# Patient Record
Sex: MALE | Race: WHITE | NOT HISPANIC OR LATINO | URBAN - METROPOLITAN AREA
[De-identification: names, ages, dates, MRNs, and addresses within clinical notes are randomized per-mention and may not be internally consistent; named-entity substitution may affect disease eponyms.]

---

## 2022-03-21 ENCOUNTER — NEW PATIENT (OUTPATIENT)
Dept: URBAN - METROPOLITAN AREA CLINIC 4 | Facility: CLINIC | Age: 32
End: 2022-03-21

## 2022-03-21 DIAGNOSIS — H11.153: ICD-10-CM

## 2022-03-21 PROCEDURE — 92015 DETERMINE REFRACTIVE STATE: CPT

## 2022-03-21 PROCEDURE — 92004 COMPRE OPH EXAM NEW PT 1/>: CPT

## 2022-03-21 ASSESSMENT — KERATOMETRY
OS_AXISANGLE_DEGREES: 007
OD_AXISANGLE_DEGREES: 162
OD_AXISANGLE2_DEGREES: 72
OS_K2POWER_DIOPTERS: 41.00
OS_K1POWER_DIOPTERS: 40.25
OD_K2POWER_DIOPTERS: 41.25
OD_K1POWER_DIOPTERS: 40.00
OS_AXISANGLE2_DEGREES: 97

## 2022-03-21 ASSESSMENT — VISUAL ACUITY
OS_GLARE: 20/40
OD_SC: 20/20-1
OS_SC: 20/20
OU_SC: 20/15-1
OD_GLARE: 20/40

## 2022-03-21 ASSESSMENT — TONOMETRY
OS_IOP_MMHG: 16
OD_IOP_MMHG: 17

## 2022-03-21 NOTE — PATIENT DISCUSSION
Improving-started on 3/1/2022. Doing facial exercises and eye exercises. Continue Thera Tears 2 x a day and PRN.  Will follow for changes.  Status post anti viral and steroid treatment, minimal corneal drying. Will increase topical drops 4-6x/day and ointment hs os.

## 2022-05-17 ENCOUNTER — ESTABLISHED PATIENT (OUTPATIENT)
Dept: URBAN - METROPOLITAN AREA CLINIC 4 | Facility: CLINIC | Age: 32
End: 2022-05-17

## 2022-05-17 DIAGNOSIS — D23.121: ICD-10-CM

## 2022-05-17 DIAGNOSIS — G51.0: ICD-10-CM

## 2022-05-17 PROCEDURE — 99213 OFFICE O/P EST LOW 20 MIN: CPT

## 2022-05-17 ASSESSMENT — VISUAL ACUITY
OD_SC: 20/20-1
OS_SC: 20/20-2

## 2022-05-17 ASSESSMENT — KERATOMETRY
OD_AXISANGLE2_DEGREES: 72
OD_K2POWER_DIOPTERS: 41.25
OD_K1POWER_DIOPTERS: 40.00
OS_AXISANGLE_DEGREES: 007
OD_AXISANGLE_DEGREES: 162
OS_K1POWER_DIOPTERS: 40.25
OS_AXISANGLE2_DEGREES: 97
OS_K2POWER_DIOPTERS: 41.00

## 2022-05-17 ASSESSMENT — TONOMETRY: OS_IOP_MMHG: 15

## 2024-03-12 ENCOUNTER — EMERGENCY VISIT (OUTPATIENT)
Facility: LOCATION | Age: 34
End: 2024-03-12

## 2024-03-12 DIAGNOSIS — H10.011: ICD-10-CM

## 2024-03-12 DIAGNOSIS — D49.2: ICD-10-CM

## 2024-03-12 DIAGNOSIS — H21.89: ICD-10-CM

## 2024-03-12 PROCEDURE — 99213 OFFICE O/P EST LOW 20 MIN: CPT

## 2024-03-12 PROCEDURE — 92285 EXTERNAL OCULAR PHOTOGRAPHY: CPT

## 2024-03-12 RX ORDER — ACYCLOVIR 400 MG/1: 1 TABLET ORAL TWICE A DAY

## 2024-03-12 RX ORDER — PREDNISONE 20MG 20 MG/1: 1 TABLET ORAL

## 2024-03-12 RX ORDER — AMOXICILLIN AND CLAVULANATE POTASSIUM 500; 125 MG/1; 1/1
TABLET, FILM COATED ORAL
End: 2024-03-12

## 2024-03-12 RX ORDER — NEOMYCIN SULFATE, POLYMYXIN B SULFATE AND DEXAMETHASONE 3.5; 10000; 1 MG/ML; [USP'U]/ML; MG/ML
1 SUSPENSION OPHTHALMIC
Start: 2024-03-12

## 2024-03-12 ASSESSMENT — TONOMETRY
OS_IOP_MMHG: 18
OD_IOP_MMHG: 18

## 2024-03-12 ASSESSMENT — VISUAL ACUITY
OD_SC: 20/20-2
OU_SC: 20/20
OS_SC: 20/20

## 2024-03-15 ENCOUNTER — FOLLOW UP (OUTPATIENT)
Dept: URBAN - METROPOLITAN AREA CLINIC 18 | Facility: CLINIC | Age: 34
End: 2024-03-15

## 2024-03-15 DIAGNOSIS — B00.59: ICD-10-CM

## 2024-03-15 DIAGNOSIS — B00.52: ICD-10-CM

## 2024-03-15 DIAGNOSIS — H10.011: ICD-10-CM

## 2024-03-15 PROCEDURE — 99213 OFFICE O/P EST LOW 20 MIN: CPT

## 2024-03-15 RX ORDER — TRIFLURIDINE 10 MG/ML: 1 SOLUTION OPHTHALMIC

## 2024-03-15 ASSESSMENT — TONOMETRY
OS_IOP_MMHG: 17
OD_IOP_MMHG: 18

## 2024-03-15 ASSESSMENT — VISUAL ACUITY
OD_SC: 20/25
OS_SC: 20/20

## 2024-03-19 ENCOUNTER — FOLLOW UP (OUTPATIENT)
Facility: LOCATION | Age: 34
End: 2024-03-19

## 2024-03-19 DIAGNOSIS — B00.52: ICD-10-CM

## 2024-03-19 DIAGNOSIS — B00.59: ICD-10-CM

## 2024-03-19 PROCEDURE — 99213 OFFICE O/P EST LOW 20 MIN: CPT

## 2024-03-19 RX ORDER — VALACYCLOVIR HYDROCHLORIDE 500 MG/1
TABLET, FILM COATED ORAL
Start: 2024-03-19 | End: 2024-03-29

## 2024-03-19 ASSESSMENT — VISUAL ACUITY
OD_SC: 20/60
OD_PH: 20/30-2
OS_SC: 20/25

## 2024-03-19 ASSESSMENT — TONOMETRY
OD_IOP_MMHG: 16
OS_IOP_MMHG: 18

## 2024-03-22 ENCOUNTER — FOLLOW UP (OUTPATIENT)
Dept: URBAN - METROPOLITAN AREA CLINIC 18 | Facility: CLINIC | Age: 34
End: 2024-03-22

## 2024-03-22 DIAGNOSIS — B00.52: ICD-10-CM

## 2024-03-22 DIAGNOSIS — B00.59: ICD-10-CM

## 2024-03-22 PROCEDURE — 99213 OFFICE O/P EST LOW 20 MIN: CPT

## 2024-03-22 ASSESSMENT — TONOMETRY
OS_IOP_MMHG: 18
OD_IOP_MMHG: 16

## 2024-03-22 ASSESSMENT — VISUAL ACUITY
OS_SC: 20/20
OD_SC: 20/30-2

## 2024-03-28 ENCOUNTER — FOLLOW UP (OUTPATIENT)
Dept: URBAN - METROPOLITAN AREA CLINIC 18 | Facility: CLINIC | Age: 34
End: 2024-03-28

## 2024-03-28 DIAGNOSIS — B00.52: ICD-10-CM

## 2024-03-28 PROCEDURE — 99213 OFFICE O/P EST LOW 20 MIN: CPT

## 2024-03-28 ASSESSMENT — VISUAL ACUITY
OD_SC: 20/25-2
OS_SC: 20/20

## 2024-03-28 ASSESSMENT — TONOMETRY
OS_IOP_MMHG: 16
OD_IOP_MMHG: 15

## 2024-04-05 ENCOUNTER — FOLLOW UP (OUTPATIENT)
Dept: URBAN - NONMETROPOLITAN AREA CLINIC 6 | Facility: CLINIC | Age: 34
End: 2024-04-05

## 2024-04-05 DIAGNOSIS — B00.52: ICD-10-CM

## 2024-04-05 PROCEDURE — 99213 OFFICE O/P EST LOW 20 MIN: CPT

## 2024-04-05 RX ORDER — GLYCERIN 2 MG/ML: 1 SOLUTION/ DROPS OPHTHALMIC TWICE A DAY

## 2024-04-05 ASSESSMENT — TONOMETRY
OD_IOP_MMHG: 14
OS_IOP_MMHG: 19

## 2024-04-05 ASSESSMENT — VISUAL ACUITY
OS_SC: 20/20
OD_SC: 20/25

## 2024-04-08 ENCOUNTER — FOLLOW UP (OUTPATIENT)
Facility: LOCATION | Age: 34
End: 2024-04-08

## 2024-04-08 DIAGNOSIS — H01.02B: ICD-10-CM

## 2024-04-08 DIAGNOSIS — H01.02A: ICD-10-CM

## 2024-04-08 DIAGNOSIS — B00.52: ICD-10-CM

## 2024-04-08 PROCEDURE — 99214 OFFICE O/P EST MOD 30 MIN: CPT

## 2024-04-08 RX ORDER — VALACYCLOVIR HYDROCHLORIDE 500 MG/1
1 TABLET, FILM COATED ORAL
Start: 2024-04-08

## 2024-04-08 RX ORDER — PREDNISOLONE ACETATE 10 MG/ML
1 SUSPENSION/ DROPS OPHTHALMIC
Start: 2024-04-08

## 2024-04-08 ASSESSMENT — TONOMETRY
OS_IOP_MMHG: 17
OD_IOP_MMHG: 17

## 2024-04-08 ASSESSMENT — VISUAL ACUITY
OU_SC: 20/20
OD_SC: 20/25-2

## 2024-04-12 ENCOUNTER — FOLLOW UP (OUTPATIENT)
Facility: LOCATION | Age: 34
End: 2024-04-12

## 2024-04-12 DIAGNOSIS — B00.52: ICD-10-CM

## 2024-04-12 DIAGNOSIS — H01.02B: ICD-10-CM

## 2024-04-12 DIAGNOSIS — H01.02A: ICD-10-CM

## 2024-04-12 PROCEDURE — 99213 OFFICE O/P EST LOW 20 MIN: CPT

## 2024-04-12 RX ORDER — OFLOXACIN 3 MG/ML
1 SOLUTION OPHTHALMIC
Start: 2024-04-12

## 2024-04-12 RX ORDER — BUSPIRONE HYDROCHLORIDE 10 MG/1
1 TABLET ORAL
Start: 2024-04-12

## 2024-04-12 ASSESSMENT — VISUAL ACUITY
OU_SC: 20/20
OD_SC: 20/30+1
OS_SC: 20/20

## 2024-04-12 ASSESSMENT — TONOMETRY
OS_IOP_MMHG: 17
OD_IOP_MMHG: 16

## 2024-04-15 ENCOUNTER — FOLLOW UP (OUTPATIENT)
Facility: LOCATION | Age: 34
End: 2024-04-15

## 2024-04-15 DIAGNOSIS — H01.02B: ICD-10-CM

## 2024-04-15 DIAGNOSIS — B00.52: ICD-10-CM

## 2024-04-15 DIAGNOSIS — H01.02A: ICD-10-CM

## 2024-04-15 PROCEDURE — 99213 OFFICE O/P EST LOW 20 MIN: CPT

## 2024-04-15 ASSESSMENT — VISUAL ACUITY
OS_SC: 20/20-1
OU_SC: 20/20
OD_SC: 20/20-1

## 2024-04-15 ASSESSMENT — TONOMETRY
OD_IOP_MMHG: 15
OS_IOP_MMHG: 16

## 2024-04-26 ENCOUNTER — FOLLOW UP (OUTPATIENT)
Facility: LOCATION | Age: 34
End: 2024-04-26

## 2024-04-26 DIAGNOSIS — B00.52: ICD-10-CM

## 2024-04-26 DIAGNOSIS — H01.02A: ICD-10-CM

## 2024-04-26 DIAGNOSIS — H01.02B: ICD-10-CM

## 2024-04-26 PROCEDURE — 99213 OFFICE O/P EST LOW 20 MIN: CPT

## 2024-04-26 RX ORDER — ACYCLOVIR 400 MG/1
1 TABLET ORAL TWICE A DAY
Start: 2024-04-26

## 2024-04-26 ASSESSMENT — VISUAL ACUITY
OS_SC: 20/20-2
OU_SC: 20/20-1
OD_SC: 20/25-2

## 2024-04-26 ASSESSMENT — TONOMETRY
OS_IOP_MMHG: 16
OD_IOP_MMHG: 16

## 2024-05-09 ENCOUNTER — FOLLOW UP (OUTPATIENT)
Dept: URBAN - METROPOLITAN AREA CLINIC 18 | Facility: CLINIC | Age: 34
End: 2024-05-09

## 2024-05-09 DIAGNOSIS — B00.52: ICD-10-CM

## 2024-05-09 DIAGNOSIS — H01.02A: ICD-10-CM

## 2024-05-09 DIAGNOSIS — H01.02B: ICD-10-CM

## 2024-05-09 PROCEDURE — 99213 OFFICE O/P EST LOW 20 MIN: CPT

## 2024-05-09 ASSESSMENT — TONOMETRY
OD_IOP_MMHG: 17
OS_IOP_MMHG: 16

## 2024-05-09 ASSESSMENT — VISUAL ACUITY
OD_SC: 20/25
OS_SC: 20/25

## 2024-06-04 ENCOUNTER — FOLLOW UP (OUTPATIENT)
Facility: LOCATION | Age: 34
End: 2024-06-04

## 2024-06-04 DIAGNOSIS — H01.02A: ICD-10-CM

## 2024-06-04 DIAGNOSIS — H01.02B: ICD-10-CM

## 2024-06-04 DIAGNOSIS — B00.52: ICD-10-CM

## 2024-06-04 PROCEDURE — 99213 OFFICE O/P EST LOW 20 MIN: CPT

## 2024-06-04 RX ORDER — CARBOXYMETHYLCELLULOSE SODIUM 10 MG/ML
1 GEL OPHTHALMIC EVERY EVENING
Start: 2024-06-04

## 2024-06-04 ASSESSMENT — TONOMETRY
OS_IOP_MMHG: 17
OD_IOP_MMHG: 16

## 2024-06-04 ASSESSMENT — VISUAL ACUITY
OD_SC: 20/25-1
OS_SC: 20/20-2
OU_SC: 20/20-1

## 2024-06-25 ENCOUNTER — FOLLOW UP (OUTPATIENT)
Facility: LOCATION | Age: 34
End: 2024-06-25

## 2024-06-25 DIAGNOSIS — H01.02B: ICD-10-CM

## 2024-06-25 DIAGNOSIS — B00.52: ICD-10-CM

## 2024-06-25 DIAGNOSIS — H01.02A: ICD-10-CM

## 2024-06-25 PROCEDURE — 99213 OFFICE O/P EST LOW 20 MIN: CPT

## 2024-06-25 ASSESSMENT — TONOMETRY
OD_IOP_MMHG: 30
OS_IOP_MMHG: 16
OD_IOP_MMHG: 20

## 2024-06-25 ASSESSMENT — VISUAL ACUITY
OS_SC: 20/20-1
OD_SC: 20/25-1

## 2024-07-11 ENCOUNTER — FOLLOW UP (OUTPATIENT)
Dept: URBAN - METROPOLITAN AREA CLINIC 18 | Facility: CLINIC | Age: 34
End: 2024-07-11

## 2024-07-11 DIAGNOSIS — H01.02B: ICD-10-CM

## 2024-07-11 DIAGNOSIS — H01.02A: ICD-10-CM

## 2024-07-11 DIAGNOSIS — B00.52: ICD-10-CM

## 2024-07-11 PROCEDURE — 92012 INTRM OPH EXAM EST PATIENT: CPT

## 2024-07-11 ASSESSMENT — VISUAL ACUITY
OD_SC: 20/25+1
OS_SC: 20/20-1
OU_SC: 20/20

## 2024-07-11 ASSESSMENT — TONOMETRY
OS_IOP_MMHG: 19
OD_IOP_MMHG: 25

## 2024-09-26 ENCOUNTER — FOLLOW UP (OUTPATIENT)
Dept: URBAN - METROPOLITAN AREA CLINIC 18 | Facility: CLINIC | Age: 34
End: 2024-09-26

## 2024-09-26 DIAGNOSIS — H01.02A: ICD-10-CM

## 2024-09-26 DIAGNOSIS — H01.02B: ICD-10-CM

## 2024-09-26 DIAGNOSIS — H16.041: ICD-10-CM

## 2024-09-26 DIAGNOSIS — B00.52: ICD-10-CM

## 2024-09-26 PROCEDURE — 99213 OFFICE O/P EST LOW 20 MIN: CPT

## 2024-09-26 RX ORDER — MOXIFLOXACIN OPHTHALMIC 5 MG/ML
1 SOLUTION/ DROPS OPHTHALMIC
Start: 2024-09-26

## 2024-10-01 ENCOUNTER — FOLLOW UP (OUTPATIENT)
Facility: LOCATION | Age: 34
End: 2024-10-01

## 2024-10-01 DIAGNOSIS — H01.02A: ICD-10-CM

## 2024-10-01 DIAGNOSIS — H16.041: ICD-10-CM

## 2024-10-01 DIAGNOSIS — H01.02B: ICD-10-CM

## 2024-10-01 DIAGNOSIS — B00.52: ICD-10-CM

## 2024-10-01 PROCEDURE — 99213 OFFICE O/P EST LOW 20 MIN: CPT

## 2024-10-01 RX ORDER — BUSPIRONE HYDROCHLORIDE 10 MG/1: 1 TABLET ORAL

## 2024-10-18 ENCOUNTER — FOLLOW UP (OUTPATIENT)
Facility: LOCATION | Age: 34
End: 2024-10-18

## 2024-10-18 DIAGNOSIS — B00.52: ICD-10-CM

## 2024-10-18 DIAGNOSIS — H01.02A: ICD-10-CM

## 2024-10-18 DIAGNOSIS — H01.02B: ICD-10-CM

## 2024-10-18 PROCEDURE — 99213 OFFICE O/P EST LOW 20 MIN: CPT

## 2024-11-08 ENCOUNTER — CONSULTATION/EVALUATION (OUTPATIENT)
Facility: LOCATION | Age: 34
End: 2024-11-08

## 2024-11-08 DIAGNOSIS — H01.02B: ICD-10-CM

## 2024-11-08 DIAGNOSIS — H01.02A: ICD-10-CM

## 2024-11-08 DIAGNOSIS — B00.52: ICD-10-CM

## 2024-11-08 PROCEDURE — 92014 COMPRE OPH EXAM EST PT 1/>: CPT

## 2024-11-08 RX ORDER — GANCICLOVIR 1.5 MG/G: 1 GEL OPHTHALMIC

## 2024-11-14 ENCOUNTER — FOLLOW UP (OUTPATIENT)
Facility: LOCATION | Age: 34
End: 2024-11-14

## 2024-11-14 DIAGNOSIS — H01.02A: ICD-10-CM

## 2024-11-14 DIAGNOSIS — H01.02B: ICD-10-CM

## 2024-11-14 DIAGNOSIS — B00.52: ICD-10-CM

## 2024-11-14 PROCEDURE — 99214 OFFICE O/P EST MOD 30 MIN: CPT

## 2024-11-21 ENCOUNTER — FOLLOW UP (OUTPATIENT)
Facility: LOCATION | Age: 34
End: 2024-11-21

## 2024-11-21 DIAGNOSIS — B00.52: ICD-10-CM

## 2024-11-21 DIAGNOSIS — H01.02B: ICD-10-CM

## 2024-11-21 DIAGNOSIS — H01.02A: ICD-10-CM

## 2024-11-21 PROCEDURE — 92012 INTRM OPH EXAM EST PATIENT: CPT

## 2024-11-27 ENCOUNTER — FOLLOW UP (OUTPATIENT)
Facility: LOCATION | Age: 34
End: 2024-11-27

## 2024-11-27 DIAGNOSIS — H01.02A: ICD-10-CM

## 2024-11-27 DIAGNOSIS — H01.02B: ICD-10-CM

## 2024-11-27 DIAGNOSIS — B00.52: ICD-10-CM

## 2024-11-27 PROCEDURE — 92012 INTRM OPH EXAM EST PATIENT: CPT

## 2024-12-20 ENCOUNTER — FOLLOW UP (OUTPATIENT)
Age: 34
End: 2024-12-20

## 2024-12-20 DIAGNOSIS — H01.02B: ICD-10-CM

## 2024-12-20 DIAGNOSIS — H01.02A: ICD-10-CM

## 2024-12-20 DIAGNOSIS — B00.52: ICD-10-CM

## 2024-12-20 PROCEDURE — 99213 OFFICE O/P EST LOW 20 MIN: CPT

## 2024-12-20 RX ORDER — PREDNISOLONE ACETATE 10 MG/ML: 1 SUSPENSION/ DROPS OPHTHALMIC

## 2025-01-10 ENCOUNTER — FOLLOW UP (OUTPATIENT)
Age: 35
End: 2025-01-10

## 2025-01-10 DIAGNOSIS — B00.52: ICD-10-CM

## 2025-01-10 DIAGNOSIS — H01.02B: ICD-10-CM

## 2025-01-10 DIAGNOSIS — H01.02A: ICD-10-CM

## 2025-01-10 PROCEDURE — 99213 OFFICE O/P EST LOW 20 MIN: CPT

## 2025-01-10 RX ORDER — PREDNISOLONE ACETATE 10 MG/ML
1 SUSPENSION/ DROPS OPHTHALMIC
End: 2025-01-10

## 2025-01-17 ENCOUNTER — FOLLOW UP (OUTPATIENT)
Age: 35
End: 2025-01-17

## 2025-01-17 DIAGNOSIS — H01.02A: ICD-10-CM

## 2025-01-17 DIAGNOSIS — H01.02B: ICD-10-CM

## 2025-01-17 DIAGNOSIS — B00.52: ICD-10-CM

## 2025-01-17 PROCEDURE — 99213 OFFICE O/P EST LOW 20 MIN: CPT

## 2025-02-14 ENCOUNTER — FOLLOW UP (OUTPATIENT)
Age: 35
End: 2025-02-14

## 2025-02-14 DIAGNOSIS — H01.02B: ICD-10-CM

## 2025-02-14 DIAGNOSIS — B00.52: ICD-10-CM

## 2025-02-14 DIAGNOSIS — H01.02A: ICD-10-CM

## 2025-02-14 PROCEDURE — 99213 OFFICE O/P EST LOW 20 MIN: CPT

## 2025-03-05 ENCOUNTER — EMERGENCY VISIT (OUTPATIENT)
Age: 35
End: 2025-03-05

## 2025-03-05 DIAGNOSIS — H01.02A: ICD-10-CM

## 2025-03-05 DIAGNOSIS — H01.02B: ICD-10-CM

## 2025-03-05 DIAGNOSIS — B00.52: ICD-10-CM

## 2025-03-05 PROCEDURE — 99213 OFFICE O/P EST LOW 20 MIN: CPT

## 2025-03-05 RX ORDER — LOTEPREDNOL ETABONATE 2.5 MG/ML
1 SUSPENSION/ DROPS OPHTHALMIC TWICE A DAY
Start: 2025-03-05

## 2025-03-20 ENCOUNTER — FOLLOW UP (OUTPATIENT)
Age: 35
End: 2025-03-20

## 2025-03-20 DIAGNOSIS — B00.52: ICD-10-CM

## 2025-03-20 DIAGNOSIS — H01.02A: ICD-10-CM

## 2025-03-20 DIAGNOSIS — H01.02B: ICD-10-CM

## 2025-03-20 PROCEDURE — 99213 OFFICE O/P EST LOW 20 MIN: CPT

## 2025-04-24 ENCOUNTER — FOLLOW UP (OUTPATIENT)
Age: 35
End: 2025-04-24

## 2025-04-24 DIAGNOSIS — B00.52: ICD-10-CM

## 2025-04-24 DIAGNOSIS — H01.02A: ICD-10-CM

## 2025-04-24 DIAGNOSIS — H01.02B: ICD-10-CM

## 2025-04-24 PROCEDURE — 99213 OFFICE O/P EST LOW 20 MIN: CPT

## 2025-04-24 RX ORDER — PREDNISOLONE ACETATE 10 MG/ML
1 SUSPENSION/ DROPS OPHTHALMIC ONCE A DAY
Start: 2025-04-24

## 2025-05-15 ENCOUNTER — FOLLOW UP (OUTPATIENT)
Age: 35
End: 2025-05-15

## 2025-05-15 DIAGNOSIS — H01.02A: ICD-10-CM

## 2025-05-15 DIAGNOSIS — B00.52: ICD-10-CM

## 2025-05-15 DIAGNOSIS — H01.02B: ICD-10-CM

## 2025-05-15 PROCEDURE — 99213 OFFICE O/P EST LOW 20 MIN: CPT

## 2025-06-12 ENCOUNTER — FOLLOW UP (OUTPATIENT)
Age: 35
End: 2025-06-12

## 2025-06-12 DIAGNOSIS — B00.52: ICD-10-CM

## 2025-06-12 DIAGNOSIS — H01.02B: ICD-10-CM

## 2025-06-12 DIAGNOSIS — H01.02A: ICD-10-CM

## 2025-06-12 PROCEDURE — 99213 OFFICE O/P EST LOW 20 MIN: CPT

## 2025-08-28 ENCOUNTER — FOLLOW UP (OUTPATIENT)
Age: 35
End: 2025-08-28

## 2025-08-28 ENCOUNTER — PREPPED CHART (OUTPATIENT)
Age: 35
End: 2025-08-28

## 2025-08-28 DIAGNOSIS — H01.02A: ICD-10-CM

## 2025-08-28 DIAGNOSIS — B00.52: ICD-10-CM

## 2025-08-28 DIAGNOSIS — H01.02B: ICD-10-CM

## 2025-08-28 PROCEDURE — 99213 OFFICE O/P EST LOW 20 MIN: CPT
